# Patient Record
Sex: FEMALE | Race: BLACK OR AFRICAN AMERICAN | URBAN - METROPOLITAN AREA
[De-identification: names, ages, dates, MRNs, and addresses within clinical notes are randomized per-mention and may not be internally consistent; named-entity substitution may affect disease eponyms.]

---

## 2017-01-31 ENCOUNTER — ALLIED HEALTH/NURSE VISIT (OUTPATIENT)
Dept: CARDIOLOGY | Facility: CLINIC | Age: 37
End: 2017-01-31
Attending: INTERNAL MEDICINE
Payer: COMMERCIAL

## 2017-01-31 DIAGNOSIS — I42.9 CARDIOMYOPATHY (H): Primary | ICD-10-CM

## 2017-01-31 DIAGNOSIS — I50.22 CHRONIC SYSTOLIC HEART FAILURE (H): ICD-10-CM

## 2017-01-31 PROCEDURE — 93295 DEV INTERROG REMOTE 1/2/MLT: CPT | Performed by: INTERNAL MEDICINE

## 2017-01-31 PROCEDURE — 93296 REM INTERROG EVL PM/IDS: CPT | Mod: ZF

## 2017-01-31 NOTE — PROGRESS NOTES
Pt sent in routine remote ICD check for evaluation per MD order.  Her ICD check does not show any episodes of ventricular arrhythmias, she is RV pacing <0.1% of the time, her optivol trend is at baseline and her heart rate histograms show good heart rate variation.  Her ICD battery estimates 10.5 years left and her ICD is functioning normally.  Pt was called with the results and she reports feeling well and she denies complaints.   We will plan for another remote ICD check on 5/2/17.    Remote ICD

## 2017-06-12 ENCOUNTER — ALLIED HEALTH/NURSE VISIT (OUTPATIENT)
Dept: CARDIOLOGY | Facility: CLINIC | Age: 37
End: 2017-06-12
Attending: INTERNAL MEDICINE
Payer: COMMERCIAL

## 2017-06-12 DIAGNOSIS — I42.9 CARDIOMYOPATHY (H): Primary | ICD-10-CM

## 2017-06-12 PROCEDURE — 93296 REM INTERROG EVL PM/IDS: CPT | Mod: ZF

## 2017-06-12 PROCEDURE — 93295 DEV INTERROG REMOTE 1/2/MLT: CPT | Performed by: INTERNAL MEDICINE

## 2017-06-12 NOTE — MR AVS SNAPSHOT
After Visit Summary   6/12/2017    Carolyn Aggarwal    MRN: [id number]           Patient Information     Date Of Birth          1980        Visit Information        Provider Department      6/12/2017 6:00 AM  ICD [facility]        Today's Diagnoses     Cardiomyopathy (H)    -  1       Follow-ups after your visit        Who to contact     If you have questions or need follow up information about today's clinic visit or your schedule please contact [facility] directly at [phone].  Normal or non-critical lab and imaging results will be communicated to you by [employer], letter or phone within 4 business days after the clinic has received the results. If you do not hear from us within 7 days, please contact the clinic through [employer] or phone. If you have a critical or abnormal lab result, we will notify you by phone as soon as possible.  Submit refill requests through [employer] or call your pharmacy and they will forward the refill request to us. Please allow 3 business days for your refill to be completed.          Additional Information About Your Visit        MyChart Information     [employer] gives you secure access to your electronic health record. If you see a primary care provider, you can also send messages to your care team and make appointments. If you have questions, please call your primary care clinic.  If you do not have a primary care provider, please call [phone] and they will assist you.        Care EveryWhere ID     This is your Care EveryWhere ID. This could be used by other organizations to access your [place] medical records  [id number]         Blood Pressure from Last 3 Encounters:   09/23/16 120/72   09/12/16 131/70   09/10/16 107/63    Weight from Last 3 Encounters:   09/23/16 126.5 kg (278 lb 14.4 oz)   09/21/16 126.9 kg (279 lb 12.8 oz)   09/12/16 128.4 kg (283 lb)              We Performed the Following     INTERROGATION DEVICE CLEMENTE HARRELL,  PACER/ICD        Primary Care Provider Office Phone # Fax #    Jin Zarco [phone]       [facility] [place] [address] [address]        Thank you!     Thank you for choosing [facility]  for your care. Our goal is always to provide you with excellent care. Hearing back from our patients is one way we can continue to improve our services. Please take a few minutes to complete the written survey that you may receive in the mail after your visit with us. Thank you!             Your Updated Medication List - Protect others around you: Learn how to safely use, store and throw away your medicines at www.disposemymeds.org.          This list is accurate as of: 6/12/17 11:59 PM.  Always use your most recent med list.                   Brand Name Dispense Instructions for use    buPROPion 300 MG 24 hr tablet    WELLBUTRIN XL     Take 300 mg by mouth       escitalopram 10 MG tablet    LEXAPRO     Take 10 mg by mouth daily       * gabapentin 300 MG capsule    NEURONTIN     Take 300 mg by mouth 2 times daily       * gabapentin 300 MG capsule    NEURONTIN     Take 600 mg by mouth 2 times daily       IRON (FERROUS GLUCONATE) PO      Take 325 mg by mouth       isosorbide dinitrate 10 MG tablet    ISORDIL    90 tablet    Take 1 tablet (10 mg) by mouth 3 times daily       isosorbide mononitrate 30 MG 24 hr tablet    IMDUR     Take 30 mg by mouth       lamoTRIgine 25 MG tablet    LaMICtal     Take 25 mg by mouth       * losartan 25 MG tablet    COZAAR    90 tablet    Take 1 tablet (25 mg) by mouth daily       * losartan 25 MG tablet    COZAAR     Take 12.5 mg by mouth       magnesium oxide 400 (240 MG) MG tablet    MAG-OX     Take 400 mg by mouth       magnesium oxide 400 MG Caps      Take 400 mg by mouth daily       metoprolol 100 MG 24 hr tablet    TOPROL-XL     Take 1 tablet (100 mg) by mouth daily       naltrexone 50 MG tablet    DEPADE;REVIA    30 tablet    Take 1/2  Tablet then increase to maximum of 1 Full Tablet daily as tolerated.  Time it one to two hours prior to worst cravings       omeprazole 20 MG CR capsule    priLOSEC     Take 1 capsule (20 mg) by mouth daily       pantoprazole 40 MG EC tablet    PROTONIX     Take 40 mg by mouth       Potassium Chloride 40 MEQ/15ML (20%) Soln     1350 mL    Take 15 mLs (40 mEq) by mouth 3 times daily       potassium chloride SA 20 MEQ CR tablet    K-DUR/KLOR-CON M     Take 40 mEq by mouth daily       RA CETIRIZINE 10 MG tablet   Generic drug:  cetirizine      Take 10 mg by mouth       rivaroxaban ANTICOAGULANT 20 MG Tabs tablet    XARELTO    90 tablet    Take 1 tablet (20 mg) by mouth daily (with dinner)       spironolactone 25 MG tablet    ALDACTONE    180 tablet    Take 2 tablets (50 mg) by mouth daily       * topiramate 25 MG tablet    TOPAMAX    90 tablet    25 mg at bedtime for 1 week, 50 mg at bedtime for 1 week and 75 mg daily at bedtime thereafter       * topiramate 50 MG tablet    TOPAMAX    120 tablet    Take 1 tablet (50 mg) by mouth 2 times daily       torsemide 20 MG tablet    DEMADEX    90 tablet    Take 3 tablets (60 mg) by mouth daily       traZODone 100 MG tablet    DESYREL     Take 100 mg by mouth At Bedtime       * Notice:  This list has 6 medication(s) that are the same as other medications prescribed for you. Read the directions carefully, and ask your doctor or other care provider to review them with you.

## 2017-06-14 NOTE — PROGRESS NOTES
Remote ICD transmission received and reviewed.  Device transmission sent per MD orders.  Patient has a Medtronic single lead ICD.  Normal ICD function.  1 NSVT episode recorded - 1 sec, 231 bpm.  Presenting EGM = ST @ 115 bpm.   = <0.1%.  Thoracic impedance is slightly below the reference line.  Estimated battery longevity to JOSHUA = 10.2 years.  Patient notified of interrogation results.  Patient reports that she is feeling well.  Patient does report that she was a little more SOB this morning so she took an extra diuretic as prescribed.  Patient encouraged to call her physician if symptoms are not resolved.  Plan for patient to send a remote transmission in 3 months as scheduled.    Remote ICD transmission

## 2017-09-27 ENCOUNTER — ALLIED HEALTH/NURSE VISIT (OUTPATIENT)
Dept: CARDIOLOGY | Facility: CLINIC | Age: 37
End: 2017-09-27
Attending: INTERNAL MEDICINE
Payer: COMMERCIAL

## 2017-09-27 DIAGNOSIS — I42.9 CARDIOMYOPATHY (H): Primary | ICD-10-CM

## 2017-09-27 PROCEDURE — 93296 REM INTERROG EVL PM/IDS: CPT | Mod: ZF

## 2017-09-27 PROCEDURE — 93295 DEV INTERROG REMOTE 1/2/MLT: CPT | Performed by: INTERNAL MEDICINE

## 2017-09-27 NOTE — PROGRESS NOTES
"Scheduled Medtronic Carelink remote ICD transmission received and reviewed. Device transmission sent per MD orders. Her presenting rhythm is a regular ventricular rhythm @ 110 bpm. No arrhythmias recorded. Normal device function.  <0.1%. No short V-V intervals recorded. Lead trends appear stable. OptiVol thoracic impedance suggests increasing fluid retention. Battery estimates 10 years to JOSHUA. Pt notified of transmission results. Pt states she is SOB, sleeps sitting up and has \"swelling everywhere\". She states she has been to the [facility] ER twice \"and they keep sending me home\". She said she will go to the [facility] ER tonight if the SOB increases otherwise will call [facility] cardiologist or [facility] cardiologist in the morning to schedule an appointment. She has not been seen here since 8/2016. Pt's sister on the phone and verbalized understanding to having pt assessed in the very near future.  Remote ICD transmission      "

## 2017-09-27 NOTE — MR AVS SNAPSHOT
After Visit Summary   9/27/2017    Carolyn Aggarwal    MRN: [id number]           Patient Information     Date Of Birth          1980        Visit Information        Provider Department      9/27/2017 6:00 AM  ICD [facility]        Today's Diagnoses     Cardiomyopathy (H)    -  1       Follow-ups after your visit        Who to contact     If you have questions or need follow up information about today's clinic visit or your schedule please contact [facility] directly at [phone].  Normal or non-critical lab and imaging results will be communicated to you by [employer], letter or phone within 4 business days after the clinic has received the results. If you do not hear from us within 7 days, please contact the clinic through [employer] or phone. If you have a critical or abnormal lab result, we will notify you by phone as soon as possible.  Submit refill requests through [employer] or call your pharmacy and they will forward the refill request to us. Please allow 3 business days for your refill to be completed.          Additional Information About Your Visit        MyChart Information     [employer] gives you secure access to your electronic health record. If you see a primary care provider, you can also send messages to your care team and make appointments. If you have questions, please call your primary care clinic.  If you do not have a primary care provider, please call [phone] and they will assist you.        Care EveryWhere ID     This is your Care EveryWhere ID. This could be used by other organizations to access your [place] medical records  [id number]         Blood Pressure from Last 3 Encounters:   09/23/16 120/72   09/12/16 131/70   09/10/16 107/63    Weight from Last 3 Encounters:   09/23/16 126.5 kg (278 lb 14.4 oz)   09/21/16 126.9 kg (279 lb 12.8 oz)   09/12/16 128.4 kg (283 lb)              We Performed the Following     INTERROGATION DEVICE CLEMENTE HARRELL,  PACER/ICD        Primary Care Provider Office Phone # Fax #    Jin Zarco [phone]       [facility] [address] [facility] [address]        Equal Access to Services     NAA ROD : Hadii aad ku hadalmao Soomaali, waaxda luqadaha, qaybta kaalmada adeegyada, kanwal arguellon linda charles latoddeladio . So [facility] [phone].    ATENCIÓN: Si habla español, tiene a watson disposición servicios gratuitos de asistencia lingüística. [facility] [phone].    We comply with applicable federal civil rights laws and [place] laws. We do not discriminate on the basis of race, color, national origin, age, disability sex, sexual orientation or gender identity.            Thank you!     Thank you for choosing [facility]  for your care. Our goal is always to provide you with excellent care. Hearing back from our patients is one way we can continue to improve our services. Please take a few minutes to complete the written survey that you may receive in the mail after your visit with us. Thank you!             Your Updated Medication List - Protect others around you: Learn how to safely use, store and throw away your medicines at www.disposemymeds.org.          This list is accurate as of: 9/27/17  5:30 PM.  Always use your most recent med list.                   Brand Name Dispense Instructions for use Diagnosis    buPROPion 300 MG 24 hr tablet    WELLBUTRIN XL     Take 300 mg by mouth        escitalopram 10 MG tablet    LEXAPRO     Take 10 mg by mouth daily        * gabapentin 300 MG capsule    NEURONTIN     Take 300 mg by mouth 2 times daily        * gabapentin 300 MG capsule    NEURONTIN     Take 600 mg by mouth 2 times daily        IRON (FERROUS GLUCONATE) PO      Take 325 mg by mouth        isosorbide dinitrate 10 MG tablet    ISORDIL    90 tablet    Take 1 tablet (10 mg) by mouth 3 times daily    Chronic systolic heart failure (H)       isosorbide mononitrate 30 MG 24 hr  tablet    IMDUR     Take 30 mg by mouth        lamoTRIgine 25 MG tablet    LaMICtal     Take 25 mg by mouth        * losartan 25 MG tablet    COZAAR    90 tablet    Take 1 tablet (25 mg) by mouth daily    Chronic systolic congestive heart failure (H)       * losartan 25 MG tablet    COZAAR     Take 12.5 mg by mouth        magnesium oxide 400 (240 MG) MG tablet    MAG-OX     Take 400 mg by mouth        magnesium oxide 400 MG Caps      Take 400 mg by mouth daily    Hypomagnesemia       metoprolol 100 MG 24 hr tablet    TOPROL-XL     Take 1 tablet (100 mg) by mouth daily    Chronic systolic heart failure (H), NSTEMI (non-ST elevated myocardial infarction) (H)       naltrexone 50 MG tablet    DEPADE;REVIA    30 tablet    Take 1/2 Tablet then increase to maximum of 1 Full Tablet daily as tolerated.  Time it one to two hours prior to worst cravings    Morbid obesity, unspecified obesity type (H)       omeprazole 20 MG CR capsule    priLOSEC     Take 1 capsule (20 mg) by mouth daily    Gastroesophageal reflux disease without esophagitis       pantoprazole 40 MG EC tablet    PROTONIX     Take 40 mg by mouth        Potassium Chloride 40 MEQ/15ML (20%) Soln     1350 mL    Take 15 mLs (40 mEq) by mouth 3 times daily    Hypokalemia       potassium chloride SA 20 MEQ CR tablet    K-DUR/KLOR-CON M     Take 40 mEq by mouth daily        RA CETIRIZINE 10 MG tablet   Generic drug:  cetirizine      Take 10 mg by mouth        rivaroxaban ANTICOAGULANT 20 MG Tabs tablet    XARELTO    90 tablet    Take 1 tablet (20 mg) by mouth daily (with dinner)    History of pulmonary embolism       spironolactone 25 MG tablet    ALDACTONE    180 tablet    Take 2 tablets (50 mg) by mouth daily    Gastroesophageal reflux disease without esophagitis       * topiramate 25 MG tablet    TOPAMAX    90 tablet    25 mg at bedtime for 1 week, 50 mg at bedtime for 1 week and 75 mg daily at bedtime thereafter    Morbid obesity due to excess calories (H)       *  topiramate 50 MG tablet    TOPAMAX    120 tablet    Take 1 tablet (50 mg) by mouth 2 times daily    Morbid obesity, unspecified obesity type (H)       torsemide 20 MG tablet    DEMADEX    90 tablet    Take 3 tablets (60 mg) by mouth daily    Chronic systolic congestive heart failure (H)       traZODone 100 MG tablet    DESYREL     Take 100 mg by mouth At Bedtime    Depression with anxiety       * Notice:  This list has 6 medication(s) that are the same as other medications prescribed for you. Read the directions carefully, and ask your doctor or other care provider to review them with you.

## 2017-09-28 ENCOUNTER — TELEPHONE (OUTPATIENT)
Dept: CARDIOLOGY | Facility: CLINIC | Age: 37
End: 2017-09-28

## 2017-09-29 ENCOUNTER — HOSPITAL ENCOUNTER (OUTPATIENT)
Facility: CLINIC | Age: 37
Setting detail: SPECIMEN
Discharge: HOME | End: 2017-09-29
Admitting: NURSE PRACTITIONER
Payer: COMMERCIAL

## 2017-09-29 DIAGNOSIS — I50.22 CHRONIC SYSTOLIC HEART FAILURE (H): ICD-10-CM

## 2017-09-29 LAB
ANION GAP SERPL CALCULATED.3IONS-SCNC: 6 MMOL/L (ref 3–14)
BUN SERPL-MCNC: 12 MG/DL (ref 7–30)
CALCIUM SERPL-MCNC: 8.4 MG/DL (ref 8.5–10.1)
CHLORIDE SERPL-SCNC: 106 MMOL/L (ref 94–109)
CO2 SERPL-SCNC: 29 MMOL/L (ref 20–32)
CREAT SERPL-MCNC: 0.76 MG/DL (ref 0.52–1.04)
GFR SERPL CREATININE-BSD FRML MDRD: 85 ML/MIN/1.7M2
GLUCOSE SERPL-MCNC: 98 MG/DL (ref 70–99)
POTASSIUM SERPL-SCNC: 3.4 MMOL/L (ref 3.4–5.3)
SODIUM SERPL-SCNC: 141 MMOL/L (ref 133–144)

## 2017-09-29 PROCEDURE — 36415 COLL VENOUS BLD VENIPUNCTURE: CPT | Performed by: NURSE PRACTITIONER

## 2017-09-30 ENCOUNTER — HOSPITAL ENCOUNTER (EMERGENCY)
Facility: CLINIC | Age: 37
Discharge: HOME | End: 2017-09-30
Attending: EMERGENCY MEDICINE | Admitting: EMERGENCY MEDICINE
Payer: COMMERCIAL

## 2017-09-30 ENCOUNTER — APPOINTMENT (OUTPATIENT)
Dept: GENERAL RADIOLOGY | Facility: CLINIC | Age: 37
End: 2017-09-30
Attending: EMERGENCY MEDICINE
Payer: COMMERCIAL

## 2017-09-30 ENCOUNTER — APPOINTMENT (OUTPATIENT)
Dept: CT IMAGING | Facility: CLINIC | Age: 37
End: 2017-09-30
Attending: EMERGENCY MEDICINE
Payer: COMMERCIAL

## 2017-09-30 ENCOUNTER — ALLIED HEALTH/NURSE VISIT (OUTPATIENT)
Dept: CARDIOLOGY | Facility: CLINIC | Age: 37
End: 2017-09-30
Attending: INTERNAL MEDICINE
Payer: COMMERCIAL

## 2017-09-30 VITALS
WEIGHT: 287.7 LBS | TEMPERATURE: 98.5 F | DIASTOLIC BLOOD PRESSURE: 91 MMHG | BODY MASS INDEX: 52.6 KG/M2 | OXYGEN SATURATION: 99 % | SYSTOLIC BLOOD PRESSURE: 134 MMHG | RESPIRATION RATE: 25 BRPM

## 2017-09-30 DIAGNOSIS — I42.0 DILATED CARDIOMYOPATHY (H): ICD-10-CM

## 2017-09-30 DIAGNOSIS — E87.70 HYPERVOLEMIA, UNSPECIFIED HYPERVOLEMIA TYPE: ICD-10-CM

## 2017-09-30 DIAGNOSIS — I42.9 CARDIOMYOPATHY (H): Primary | ICD-10-CM

## 2017-09-30 DIAGNOSIS — E87.79 OTHER HYPERVOLEMIA: ICD-10-CM

## 2017-09-30 DIAGNOSIS — R06.00 DYSPNEA, UNSPECIFIED TYPE: ICD-10-CM

## 2017-09-30 DIAGNOSIS — E87.6 HYPOKALEMIA: ICD-10-CM

## 2017-09-30 LAB
ALBUMIN SERPL-MCNC: 2.9 G/DL (ref 3.4–5)
ALBUMIN UR-MCNC: NEGATIVE MG/DL
ALP SERPL-CCNC: 83 U/L (ref 40–150)
ALT SERPL W P-5'-P-CCNC: 28 U/L (ref 0–50)
AMPHETAMINES UR QL SCN: NEGATIVE
ANION GAP SERPL CALCULATED.3IONS-SCNC: 9 MMOL/L (ref 3–14)
APPEARANCE UR: CLEAR
AST SERPL W P-5'-P-CCNC: 19 U/L (ref 0–45)
BARBITURATES UR QL: NEGATIVE
BASOPHILS # BLD AUTO: 0 10E9/L (ref 0–0.2)
BASOPHILS NFR BLD AUTO: 0.3 %
BENZODIAZ UR QL: NEGATIVE
BILIRUB DIRECT SERPL-MCNC: 0.1 MG/DL (ref 0–0.2)
BILIRUB SERPL-MCNC: 0.5 MG/DL (ref 0.2–1.3)
BILIRUB UR QL STRIP: NEGATIVE
BUN SERPL-MCNC: 8 MG/DL (ref 7–30)
CALCIUM SERPL-MCNC: 8.3 MG/DL (ref 8.5–10.1)
CANNABINOIDS UR QL SCN: NEGATIVE
CHLORIDE SERPL-SCNC: 108 MMOL/L (ref 94–109)
CO2 SERPL-SCNC: 26 MMOL/L (ref 20–32)
COCAINE UR QL: NEGATIVE
COLOR UR AUTO: ABNORMAL
CREAT SERPL-MCNC: 0.65 MG/DL (ref 0.52–1.04)
DIFFERENTIAL METHOD BLD: ABNORMAL
EOSINOPHIL # BLD AUTO: 0.3 10E9/L (ref 0–0.7)
EOSINOPHIL NFR BLD AUTO: 3.2 %
ERYTHROCYTE [DISTWIDTH] IN BLOOD BY AUTOMATED COUNT: 16.6 % (ref 10–15)
ETHANOL UR QL SCN: NEGATIVE
GFR SERPL CREATININE-BSD FRML MDRD: >90 ML/MIN/1.7M2
GLUCOSE SERPL-MCNC: 107 MG/DL (ref 70–99)
GLUCOSE UR STRIP-MCNC: NEGATIVE MG/DL
HCT VFR BLD AUTO: 33.5 % (ref 35–47)
HGB BLD-MCNC: 10.1 G/DL (ref 11.7–15.7)
HGB UR QL STRIP: NEGATIVE
IMM GRANULOCYTES # BLD: 0 10E9/L (ref 0–0.4)
IMM GRANULOCYTES NFR BLD: 0.3 %
KETONES UR STRIP-MCNC: NEGATIVE MG/DL
LEUKOCYTE ESTERASE UR QL STRIP: NEGATIVE
LYMPHOCYTES # BLD AUTO: 2.2 10E9/L (ref 0.8–5.3)
LYMPHOCYTES NFR BLD AUTO: 24 %
MCH RBC QN AUTO: 25.2 PG (ref 26.5–33)
MCHC RBC AUTO-ENTMCNC: 30.1 G/DL (ref 31.5–36.5)
MCV RBC AUTO: 84 FL (ref 78–100)
MONOCYTES # BLD AUTO: 0.9 10E9/L (ref 0–1.3)
MONOCYTES NFR BLD AUTO: 9.5 %
MUCOUS THREADS #/AREA URNS LPF: PRESENT /LPF
NEUTROPHILS # BLD AUTO: 5.7 10E9/L (ref 1.6–8.3)
NEUTROPHILS NFR BLD AUTO: 62.7 %
NITRATE UR QL: NEGATIVE
NRBC # BLD AUTO: 0 10*3/UL
NRBC BLD AUTO-RTO: 0 /100
NT-PROBNP SERPL-MCNC: 468 PG/ML (ref 0–450)
OPIATES UR QL SCN: NEGATIVE
PH UR STRIP: 7.5 PH (ref 5–7)
PLATELET # BLD AUTO: 304 10E9/L (ref 150–450)
PLATELET # BLD EST: ABNORMAL 10*3/UL
POTASSIUM SERPL-SCNC: 3.3 MMOL/L (ref 3.4–5.3)
PROCALCITONIN SERPL-MCNC: <0.05 NG/ML
PROT SERPL-MCNC: 6.7 G/DL (ref 6.8–8.8)
RBC # BLD AUTO: 4.01 10E12/L (ref 3.8–5.2)
RBC #/AREA URNS AUTO: 0 /HPF (ref 0–2)
SODIUM SERPL-SCNC: 143 MMOL/L (ref 133–144)
SOURCE: ABNORMAL
SP GR UR STRIP: 1.01 (ref 1–1.03)
SQUAMOUS #/AREA URNS AUTO: 1 /HPF (ref 0–1)
TROPONIN I SERPL-MCNC: <0.015 UG/L (ref 0–0.04)
UROBILINOGEN UR STRIP-MCNC: NORMAL MG/DL (ref 0–2)
WBC # BLD AUTO: 9 10E9/L (ref 4–11)
WBC #/AREA URNS AUTO: 0 /HPF (ref 0–2)

## 2017-09-30 PROCEDURE — 25000132 ZZH RX MED GY IP 250 OP 250 PS 637: Performed by: EMERGENCY MEDICINE

## 2017-09-30 PROCEDURE — 80048 BASIC METABOLIC PNL TOTAL CA: CPT | Performed by: EMERGENCY MEDICINE

## 2017-09-30 PROCEDURE — 25000125 ZZHC RX 250: Performed by: EMERGENCY MEDICINE

## 2017-09-30 PROCEDURE — 93010 ELECTROCARDIOGRAM REPORT: CPT | Mod: Z6 | Performed by: EMERGENCY MEDICINE

## 2017-09-30 PROCEDURE — 93005 ELECTROCARDIOGRAM TRACING: CPT | Performed by: EMERGENCY MEDICINE

## 2017-09-30 PROCEDURE — 71020 XR CHEST 2 VW: CPT

## 2017-09-30 PROCEDURE — 80076 HEPATIC FUNCTION PANEL: CPT | Performed by: STUDENT IN AN ORGANIZED HEALTH CARE EDUCATION/TRAINING PROGRAM

## 2017-09-30 PROCEDURE — 85025 COMPLETE CBC W/AUTO DIFF WBC: CPT | Performed by: EMERGENCY MEDICINE

## 2017-09-30 PROCEDURE — S0171 BUMETANIDE 0.5 MG: HCPCS | Performed by: EMERGENCY MEDICINE

## 2017-09-30 PROCEDURE — 99285 EMERGENCY DEPT VISIT HI MDM: CPT | Mod: 25 | Performed by: EMERGENCY MEDICINE

## 2017-09-30 PROCEDURE — 96374 THER/PROPH/DIAG INJ IV PUSH: CPT | Performed by: EMERGENCY MEDICINE

## 2017-09-30 PROCEDURE — 71260 CT THORAX DX C+: CPT

## 2017-09-30 PROCEDURE — 25000128 H RX IP 250 OP 636: Performed by: EMERGENCY MEDICINE

## 2017-09-30 PROCEDURE — 80320 DRUG SCREEN QUANTALCOHOLS: CPT | Performed by: EMERGENCY MEDICINE

## 2017-09-30 PROCEDURE — 36415 COLL VENOUS BLD VENIPUNCTURE: CPT | Performed by: EMERGENCY MEDICINE

## 2017-09-30 PROCEDURE — 81001 URINALYSIS AUTO W/SCOPE: CPT | Performed by: EMERGENCY MEDICINE

## 2017-09-30 PROCEDURE — 80307 DRUG TEST PRSMV CHEM ANLYZR: CPT | Performed by: EMERGENCY MEDICINE

## 2017-09-30 PROCEDURE — 84145 PROCALCITONIN (PCT): CPT | Performed by: EMERGENCY MEDICINE

## 2017-09-30 PROCEDURE — 84484 ASSAY OF TROPONIN QUANT: CPT | Performed by: EMERGENCY MEDICINE

## 2017-09-30 PROCEDURE — 80076 HEPATIC FUNCTION PANEL: CPT | Performed by: EMERGENCY MEDICINE

## 2017-09-30 PROCEDURE — 83880 ASSAY OF NATRIURETIC PEPTIDE: CPT | Performed by: EMERGENCY MEDICINE

## 2017-09-30 RX ORDER — POTASSIUM CHLORIDE 20MEQ/15ML
40 LIQUID (ML) ORAL ONCE
Status: COMPLETED | OUTPATIENT
Start: 2017-09-30 | End: 2017-09-30

## 2017-09-30 RX ORDER — ACETAMINOPHEN 500 MG
1000 TABLET ORAL ONCE
Status: DISCONTINUED | OUTPATIENT
Start: 2017-09-30 | End: 2017-09-30 | Stop reason: HOSPADM

## 2017-09-30 RX ORDER — BUMETANIDE 0.25 MG/ML
2 INJECTION INTRAMUSCULAR; INTRAVENOUS ONCE
Status: COMPLETED | OUTPATIENT
Start: 2017-09-30 | End: 2017-09-30

## 2017-09-30 RX ORDER — IOPAMIDOL 755 MG/ML
77 INJECTION, SOLUTION INTRAVASCULAR ONCE
Status: COMPLETED | OUTPATIENT
Start: 2017-09-30 | End: 2017-09-30

## 2017-09-30 RX ADMIN — IOPAMIDOL 77 ML: 755 INJECTION, SOLUTION INTRAVENOUS at 13:37

## 2017-09-30 RX ADMIN — POTASSIUM CHLORIDE 40 MEQ: 20 SOLUTION ORAL at 14:06

## 2017-09-30 RX ADMIN — BUMETANIDE 2 MG: 0.25 INJECTION INTRAMUSCULAR; INTRAVENOUS at 14:03

## 2017-09-30 ASSESSMENT — ENCOUNTER SYMPTOMS
FATIGUE: 1
UNEXPECTED WEIGHT CHANGE: 1
COUGH: 1
CHEST TIGHTNESS: 1
FEVER: 0
SHORTNESS OF BREATH: 1
ABDOMINAL PAIN: 0

## 2017-09-30 NOTE — H&P
"         Cardiology History and Physical  Carolyn Aggarwal MRN: [id number]  Age: 37 year old, : 1980  Primary care provider: Jin Zarco           Chief Complaint:     \"I have extra fluid\"          History of Present Illness:     History is obtained from the patient     Carolyn Aggarwal is a 37 year old female with history of NICM EF 25% s/p ICD thought secondary to polysubstance abuse, moderate MR, NSTEMI (2015), ÁNGEL, prior bilateral PE on Xarelto, HTN, and obesity who presents to [facility] with increasing weight, LE edema, and SOB.    Mrs. Aggarwal reports her symptoms began getting worse on Friday,  (8 days prior to admission). Prior to this was at her usual baseline (no orthopnea/PND/trace LE edema/able to walk greater than 1 block). Was on a car trip to [place] and claims prior exacerbations have been triggered by car rides. States she follows low sodium and low fluid diet and is compliant with her medications.    Currently, she reports 4 pillow orthopnea, PND, bilateral LE edema (L>R at baseline), sharp chest pain, decreased exercise tolerance (able to walk 0.5 blocks), 13 pound weight gain, lightheadedness, and palpitations. Additionally was told by device nurse that there is increased optival impedence which signals increased fluid. Has increased her home diuretic and taken metolazone without improvement. Has been seen in the [facility] ED (primary site for care) and [facility] in the past week both times being discharged to home. In addition to above has been having a dry cough, chills, abdominal pain/distention, and recently loose stools. Denies fevers, dysuria, vomiting, melena, changes in skin.    CHF present for 3 years and has resulted in multiple hospitalizations for excess fluid. Mrs. Aggarwal states the etiology is unclear, per outpatient cardiology notes from [facility] polysubstance abuse is listed as likely cause. Currently denying any drug use.    In ED, EKG and troponin negative for " ischemia. Given history of PE, CT of chest obtained, negative for clot, evidence of pulmonary edema and aspiration vs atelectasis.    ROS is negative other than listed above.          Past Medical History:     Past Medical History:   Diagnosis Date     Congestive heart failure, unspecified      Coronary artery disease, non-occlusive     NSTEMI 12/2015 with troponin 12, vasospasm vs. embolic     Depressive disorder      Encounter for insertion of mirena IUD 2013    expelled within 1 week of insertion     Encounter for surveillance of injectable contraceptive [id number]    D/C'd 2nd weight gain     Hemoptysis 1/18/2016     Hypertension      Hypokalemia 1/18/2016     Hypomagnesemia 1/18/2016     Myocardial infarction (H)     february 2016     Normal puberty menarche age 9    cycles q mo x8+ d with severe cramps     Oral contraceptive use     NOTE: estrogen contracpetion contraindicated      Pulmonary embolism (H)               Past Surgical History:      Past Surgical History:   Procedure Laterality Date     CARDIAC SURGERY      stents x2 april 2016     CHOLECYSTECTOMY  age 23?     DEFIBRILLATOR  age 34    placed     EXTRACTION(S) DENTAL  age 18     IMPLANT PACEMAKER       KNEE SURGERY Left     arthroscoptic     ORTHOPEDIC SURGERY                Social History:     Lives with significant other, 2 adult children who do not live in the area. Former tobacco (quit 11/16), occasional EtOH use, denies illicit drug history. Spends most of her time around the house, at baseline is able to clean/cook and care for the house and herself.           Family History:     Grandmother with heart disease and ICD.          Allergies:     Allergies   Allergen Reactions     Tape [Adhesive Tape]      Liquid Adhesive Rash              Medications:     Current Facility-Administered Medications   Medication     acetaminophen (TYLENOL) tablet 1,000 mg     Current Outpatient Prescriptions   Medication Sig     naltrexone (DEPADE;REVIA) 50 MG  tablet Take 1/2 Tablet then increase to maximum of 1 Full Tablet daily as tolerated.  Time it one to two hours prior to worst cravings     topiramate (TOPAMAX) 50 MG tablet Take 1 tablet (50 mg) by mouth 2 times daily     buPROPion (WELLBUTRIN XL) 300 MG 24 hr tablet Take 300 mg by mouth     cetirizine (RA CETIRIZINE) 10 MG tablet Take 10 mg by mouth     escitalopram (LEXAPRO) 10 MG tablet Take 10 mg by mouth daily     IRON, FERROUS GLUCONATE, PO Take 325 mg by mouth     isosorbide mononitrate (IMDUR) 30 MG 24 hr tablet Take 30 mg by mouth     lamoTRIgine (LAMICTAL) 25 MG tablet Take 25 mg by mouth     pantoprazole (PROTONIX) 40 MG enteric coated tablet Take 40 mg by mouth     potassium chloride SA (K-DUR,KLOR-CON M) 20 MEQ tablet Take 40 mEq by mouth daily     gabapentin (NEURONTIN) 300 MG capsule Take 600 mg by mouth 2 times daily     losartan (COZAAR) 25 MG tablet Take 12.5 mg by mouth     magnesium oxide (MAG-OX) 400 (240 MG) MG tablet Take 400 mg by mouth     gabapentin (NEURONTIN) 300 MG capsule Take 300 mg by mouth 2 times daily     Potassium Chloride 40 MEQ/15ML (20%) SOLN Take 15 mLs (40 mEq) by mouth 3 times daily     topiramate (TOPAMAX) 25 MG tablet 25 mg at bedtime for 1 week, 50 mg at bedtime for 1 week and 75 mg daily at bedtime thereafter     torsemide (DEMADEX) 20 MG tablet Take 3 tablets (60 mg) by mouth daily     spironolactone (ALDACTONE) 25 MG tablet Take 2 tablets (50 mg) by mouth daily     isosorbide dinitrate (ISORDIL) 10 MG tablet Take 1 tablet (10 mg) by mouth 3 times daily     losartan (COZAAR) 25 MG tablet Take 1 tablet (25 mg) by mouth daily     rivaroxaban ANTICOAGULANT (XARELTO) 20 MG TABS tablet Take 1 tablet (20 mg) by mouth daily (with dinner)     magnesium oxide 400 MG CAPS Take 400 mg by mouth daily     metoprolol (TOPROL-XL) 100 MG 24 hr tablet Take 1 tablet (100 mg) by mouth daily     omeprazole (PRILOSEC) 20 MG capsule Take 1 capsule (20 mg) by mouth daily     traZODone  (DESYREL) 100 MG tablet Take 100 mg by mouth At Bedtime                 Physical Exam:     B/P: 144/95, T: 98.5, P: 100, R: 23    Wt Readings from Last 4 Encounters:   09/30/17 130.5 kg (287 lb 11.2 oz)   09/23/16 126.5 kg (278 lb 14.4 oz)   09/21/16 126.9 kg (279 lb 12.8 oz)   09/12/16 128.4 kg (283 lb)     Intake/Output Summary (Last 24 hours) at 09/30/17 1518  Last data filed at 09/30/17 1515   Gross per 24 hour   Intake              120 ml   Output                0 ml   Net              120 ml     Gen: AA&Ox3, in mild distress occasionally repositioning in bed  HEENT: AT/NC, PERRL b/l, EOM grossly intact, mucous membranes pink, moist without plaque or exudate  BACK: no CVA tenderness, no midline bony tenderness  PULM/THORAX: Clear to auscultation bilaterally, no rales/rhonchi/wheezes  CV: tachycardic, regular, S1 and S2 appreciated, no extra heart sounds, II/VI systolic murmur heard best at the apex, no rub auscultated. Mild JVD to level of lower 1/3 neck while sitting upright  ABD: obese, soft, nontender, nondistended. Normoactive bowel sounds, no HSM appreciated.  EXT: 1+ edema, no clubbing or cyanosis. No asymmetrical edema. Warm and well perfused.  NEURO: CN II-XII intact, strength 5/5 throughout    Lines: PIV    Drips: none          Data:     Labs Reviewed:     Ref. Range 9/30/2017 12:32   Sodium Latest Ref Range: 133 - 144 mmol/L 143   Potassium Latest Ref Range: 3.4 - 5.3 mmol/L 3.3 (L)   Chloride Latest Ref Range: 94 - 109 mmol/L 108   Carbon Dioxide Latest Ref Range: 20 - 32 mmol/L 26   Urea Nitrogen Latest Ref Range: 7 - 30 mg/dL 8   Creatinine Latest Ref Range: 0.52 - 1.04 mg/dL 0.65   GFR Estimate Latest Ref Range: >60 mL/min/1.7m2 >90   GFR Estimate If Black Latest Ref Range: >60 mL/min/1.7m2 >90   Calcium Latest Ref Range: 8.5 - 10.1 mg/dL 8.3 (L)   Anion Gap Latest Ref Range: 3 - 14 mmol/L 9   N-Terminal Pro BNP Inpatient Latest Ref Range: 0 - 450 pg/mL 468 (H)   Troponin I ES Latest Ref Range:  0.000 - 0.045 ug/L <0.015     Hgb: 10.1 (MCV 84)  WBC: 9.0  Plt: 304      Most Recent Imaging:     CXR 9/30: IMPRESSION:  Cardiomegaly.  Mild pulmonary vascular congestion.    CT C 9/30:  IMPRESSION:   1. No main, lobar or segmental pulmonary embolus. Respiratory motion  artifact limits evaluation of the segmental pulmonary arteries which  appears somewhat heterogeneous and attenuated though no convincing  filling defect is identified.  2. Diffuse groundglass opacity with areas of smooth interlobular  septal thickening suggestive of pulmonary edema. No pleural effusion.  3. Air trapping the superior segment of right lower lobe may be  secondary to small airway disease.  4. Small area of consolidation in the inferior posterior left lower  lobe has appearance of rounded atelectasis; however, no definite  overlying pleural abnormality is identified. Alternative  considerations include ring and/or consolidation.    EKG 9/30:  Sinus tachycardia and prolonged QTc (528)    Stress Test 7/18/17:  CONCLUSIONS  Abnormal study with Breast and Soft tissue Attenuation   Decreased EF with dilated LV - EF 35% - golbal   Inferior Scar - moderate with No significant ischemia     Echo 7/13/17:     Summary:   1. LV function is severely reduced. The visually estimated ejection fraction is 25-30%.   2. Abnormal septal motion consistent with RV pacemaker.   3. Mild concentric left ventricular hypertrophy.   4. Moderate diastolic dysfunction.   5. Severe global hypokinesis.   6. Moderate mitral valve regurgitation.   7. Compared to prior study (4/25/2016); there's no significant change.    Cath 12/2015:  No significant coronary artery disease  Severe cardiomyopathy          Assessment and Plan:     Carolyn Aggarwal is a 37 year old female with history of NICM EF 25% s/p ICD thought secondary to polysubstance abuse, moderate MR, NSTEMI (12/2015), ÁNGEL, prior bilateral PE on Xarelto, HTN, and obesity who presents to [facility] with increasing weight, LE  edema, and SOB found to have volume overload with warm extremities and normal BMP.    #Hypervolemia  #NICM EF 25% 2/2 polysubstance abuse s/p ICD: evidence of excess fluid based on imaging, increased impedence, orthopnea, PND, LE edema, elevated JVP. Fortunately, does not appear to be in shock given normal creatinine and warm/well perfused extremities. Unclear of inciting incident, could certainly be related to dietary indiscretion. EKG and troponin negative for ischemia, CT negative for PE, no evidence of arrhythmia. Possibly infectious given chills and questionable infiltrate, procalcitonin pending. Failed outpatient up titration of diuretics likely from gut edema. Having good response to IV bumex given in ED, will continue to monitor, hope to give additional dose today. Waiting on medication reconciliation to determine which home medications to continue. BP should be able to allow ACE and aldosterone inhibitors, plan to decrease beta blocker and switch to short acting.  -IV diuresis, monitor response to IV bumex and plan to repeat today  -strict I/O's and daily weights  -likely continue home ACEI and spironolactone  -anticipate decreasing home beta blocker and switching to short acting  -one additional troponin  -follow up procalcitonin - suspicion for infection low    #Hx of bilateral PE: concern given recent long car ride, fortunately CT chest negative for clot. Will continue Xarelto.    #Hypokalemia: suspect secondary to diuretics, replaced orally in ED, will recheck again in the evening and replace as needed.    FEN: cardiac/2L diet  PPX: home anticoagulation    Code Status: Full CODE     Patient to be discussed with staff attending, Dr. Darden.  Please feel free to page with questions.    Joel Ireland  PGY-2 Internal Medicine  Cardiology Service  Pager: [phone]    ADDENDUM:    Informed by ED that patient was feeling improved after dose of IV diuretic and wanted to be discharged. ED provider who has  been caring for patient felt this was reasonable. She will continue with her outpatient diuretic regimen, hopefully will have better response after dose of IV.

## 2017-09-30 NOTE — ED PROVIDER NOTES
"  History     Chief Complaint   Patient presents with     Shortness of Breath     HPI  Carolyn Aggarwal is a 37 year old female with a history of PE on Xarelto, CHF, HTN, lupus anticoagulant positive, and cardiomyopathy. The patient presents to the ED with complaints of chest tightness and SOB. She reports that her fluid has been up since last Friday, 9/22, where when she weight herself, she was at 287. She is normally at 275. The patient was seen at [facility] for her symptoms after the insistence of her cardiology nurse. She was sent home with \"nothing done\" per her report. She then was seen yesterday at the  for her complaints, and at that time they [place] labs and sent her home. She now presents because she is still not feeling better, and she is having difficulty sleeping. She reports a cough, and she has been compliant with her Xarelto as well.      PAST MEDICAL HISTORY:   Past Medical History:   Diagnosis Date     Congestive heart failure, unspecified      Coronary artery disease, non-occlusive     NSTEMI 12/2015 with troponin 12, vasospasm vs. embolic     Depressive disorder      Encounter for insertion of mirena IUD 2013    expelled within 1 week of insertion     Encounter for surveillance of injectable contraceptive [id number]    D/C'd 2nd weight gain     Hemoptysis 1/18/2016     Hypertension      Hypokalemia 1/18/2016     Hypomagnesemia 1/18/2016     Myocardial infarction (H)     february 2016     Normal puberty menarche age 9    cycles q mo x8+ d with severe cramps     Oral contraceptive use     NOTE: estrogen contracpetion contraindicated      Pulmonary embolism (H)        PAST SURGICAL HISTORY:   Past Surgical History:   Procedure Laterality Date     CARDIAC SURGERY      stents x2 april 2016     CHOLECYSTECTOMY  age 23?     DEFIBRILLATOR  age 34    placed     EXTRACTION(S) DENTAL  age 18     IMPLANT PACEMAKER       KNEE SURGERY Left     arthroscoptic     ORTHOPEDIC SURGERY         FAMILY HISTORY: "   Family History   Problem Relation Age of Onset     DIABETES No family hx of      CEREBROVASCULAR DISEASE No family hx of      Breast Cancer No family hx of      Substance Abuse No family hx of      MENTAL ILLNESS No family hx of      Genetic Disorder No family hx of      Hypertension Mother 45     Hypertension Maternal Grandmother      Thyroid Disease No family hx of      Prostate Cancer No family hx of      Other Cancer No family hx of      Coronary Artery Disease Maternal Grandmother 35     Coronary Artery Disease No family hx of      Colon Cancer No family hx of        SOCIAL HISTORY:   Social History   Substance Use Topics     Smoking status: Former Smoker     Packs/day: 1.00     Years: 1.00     Start date: 5/6/1990     Quit date: 7/18/2015     Smokeless tobacco: Never Used     Alcohol use No      Comment: 1d ~ 1x/mo MAX       Patient's Medications   New Prescriptions    No medications on file   Previous Medications    BUPROPION (WELLBUTRIN XL) 300 MG 24 HR TABLET    Take 300 mg by mouth    CETIRIZINE (RA CETIRIZINE) 10 MG TABLET    Take 10 mg by mouth    ESCITALOPRAM (LEXAPRO) 10 MG TABLET    Take 10 mg by mouth daily    GABAPENTIN (NEURONTIN) 300 MG CAPSULE    Take 300 mg by mouth 2 times daily    GABAPENTIN (NEURONTIN) 300 MG CAPSULE    Take 600 mg by mouth 2 times daily    IRON, FERROUS GLUCONATE, PO    Take 325 mg by mouth    ISOSORBIDE DINITRATE (ISORDIL) 10 MG TABLET    Take 1 tablet (10 mg) by mouth 3 times daily    ISOSORBIDE MONONITRATE (IMDUR) 30 MG 24 HR TABLET    Take 30 mg by mouth    LAMOTRIGINE (LAMICTAL) 25 MG TABLET    Take 25 mg by mouth    LOSARTAN (COZAAR) 25 MG TABLET    Take 1 tablet (25 mg) by mouth daily    LOSARTAN (COZAAR) 25 MG TABLET    Take 12.5 mg by mouth    MAGNESIUM OXIDE (MAG-OX) 400 (240 MG) MG TABLET    Take 400 mg by mouth    MAGNESIUM OXIDE 400 MG CAPS    Take 400 mg by mouth daily    METOPROLOL (TOPROL-XL) 100 MG 24 HR TABLET    Take 1 tablet (100 mg) by mouth daily     NALTREXONE (DEPADE;REVIA) 50 MG TABLET    Take 1/2 Tablet then increase to maximum of 1 Full Tablet daily as tolerated.  Time it one to two hours prior to worst cravings    OMEPRAZOLE (PRILOSEC) 20 MG CAPSULE    Take 1 capsule (20 mg) by mouth daily    PANTOPRAZOLE (PROTONIX) 40 MG ENTERIC COATED TABLET    Take 40 mg by mouth    POTASSIUM CHLORIDE 40 MEQ/15ML (20%) SOLN    Take 15 mLs (40 mEq) by mouth 3 times daily    POTASSIUM CHLORIDE SA (K-DUR,KLOR-CON M) 20 MEQ TABLET    Take 40 mEq by mouth daily    RIVAROXABAN ANTICOAGULANT (XARELTO) 20 MG TABS TABLET    Take 1 tablet (20 mg) by mouth daily (with dinner)    SPIRONOLACTONE (ALDACTONE) 25 MG TABLET    Take 2 tablets (50 mg) by mouth daily    TOPIRAMATE (TOPAMAX) 25 MG TABLET    25 mg at bedtime for 1 week, 50 mg at bedtime for 1 week and 75 mg daily at bedtime thereafter    TOPIRAMATE (TOPAMAX) 50 MG TABLET    Take 1 tablet (50 mg) by mouth 2 times daily    TORSEMIDE (DEMADEX) 20 MG TABLET    Take 3 tablets (60 mg) by mouth daily    TRAZODONE (DESYREL) 100 MG TABLET    Take 100 mg by mouth At Bedtime    Modified Medications    No medications on file   Discontinued Medications    No medications on file          Allergies   Allergen Reactions     Tape [Adhesive Tape]      Liquid Adhesive Rash             I have reviewed the Medications, Allergies, Past Medical and Surgical History, and Social History in the Epic system.    Review of Systems   Constitutional: Positive for fatigue and unexpected weight change. Negative for fever.   Respiratory: Positive for cough, chest tightness and shortness of breath.    Cardiovascular: Negative for chest pain.   Gastrointestinal: Negative for abdominal pain.   All other systems reviewed and are negative.      Physical Exam   BP: (!) 144/95  Heart Rate: 111  Temp: 98.5  F (36.9  C)  Resp: 20  Weight: 130.5 kg (287 lb 11.2 oz)  SpO2: 100 %  Physical Exam   Constitutional: She is oriented to person, place, and time. She appears  well-developed and well-nourished. No distress.   Eyes: Pupils are equal, round, and reactive to light.   Neck: JVD present.   Cardiovascular: Regular rhythm and normal heart sounds.  Tachycardia present.    Pulmonary/Chest: Effort normal. She has rales.   Abdominal: There is no tenderness.   Musculoskeletal: She exhibits edema.   Neurological: She is alert and oriented to person, place, and time.   Skin: Skin is warm. She is not diaphoretic.   Psychiatric: She has a normal mood and affect. Her behavior is normal.   Nursing note and vitals reviewed.      ED Course     ED Course     Procedures             EKG Interpretation:      Interpreted by Bruno Smith MD  Time reviewed: 12:10  Symptoms at time of EKG: Chest Pain   Rhythm: sinus tachycardia  Rate: 105  Axis: Normal  Ectopy: none  Conduction: normal  ST Segments/ T Waves: QTc prolongation to 528  Q Waves: none  Comparison to prior: Unchanged    Clinical Impression: no acute changes        Medications   bumetanide (BUMEX) injection 2 mg (2 mg Intravenous Given 9/30/17 1403)   sodium chloride (PF) 0.9% PF flush 100 mL (100 mLs Intravenous Given 9/30/17 1337)   iopamidol (ISOVUE-370) solution 77 mL (77 mLs Intravenous Given 9/30/17 1337)   potassium chloride (KAYCIEL) solution 40 mEq (40 mEq Oral Given 9/30/17 1406)       Results for orders placed or performed during the hospital encounter of 09/30/17   XR Chest 2 Views    Narrative     EXAM: XR CHEST 2 VW  9/30/2017 12:46 PM     HISTORY:  soa       COMPARISON: 9/9/2016    FINDINGS: PA and lateral views of chest. Cardiac defibrillator device  is in place. Enlarged cardiac silhouette. No pneumothorax. No pleural  effusion. Indistinct pulmonary vasculature. No focal airspace opacity.      Impression    IMPRESSION:  Cardiomegaly.  Mild pulmonary vascular congestion.     I have personally reviewed the examination and initial interpretation  and I agree with the findings.    SHAVONNE GASTELUM MD   CT Chest Pulmonary  Embolism w Contrast    Narrative    Preliminary report:  This is a preliminary resident interpretation. Full report to follow.      Impression    IMPRESSION:   1. No pulmonary embolism demonstrated.  2. Diffuse groundglass opacity and mild small septal thickening  suggestive of pulmonary edema.  3. Air trapping the superior segment of right lower lobe likely due to  small airway disease.  4. Small consolidation in the inferior posterior left lower lobe  likely round atelectasis/aspiration.          Labs Ordered and Resulted from Time of ED Arrival Up to the Time of Departure from the ED   CBC WITH PLATELETS DIFFERENTIAL - Abnormal; Notable for the following:        Result Value    Hemoglobin 10.1 (*)     Hematocrit 33.5 (*)     MCH 25.2 (*)     MCHC 30.1 (*)     RDW 16.6 (*)     All other components within normal limits   BASIC METABOLIC PANEL - Abnormal; Notable for the following:     Potassium 3.3 (*)     Glucose 107 (*)     Calcium 8.3 (*)     All other components within normal limits   NT PROBNP INPATIENT - Abnormal; Notable for the following:     N-Terminal Pro BNP Inpatient 468 (*)     All other components within normal limits   TROPONIN I   ROUTINE UA WITH MICROSCOPIC   DRUG ABUSE SCREEN 6 CHEM DEP URINE ([facility])   STRICT INTAKE AND OUTPUT            Assessments & Plan (with Medical Decision Making)   37-year-old female history of dilated cardiomyopathy here with shortness of breath, chest pain, orthopnea, dyspnea with exertion, weight gain and increased peripheral edema.  She s also been contacted by the device nurse saying that the impedance was high and she was likely fluid overloaded.  She did go to an outside hospital recently who discharged her which she felt was inappropriate.  Here she has peripheral edema, tachycardia, but not hypoxia. She does have a history of pulmonary embolism and is on Xarelto. She says she is compliant.  Her EKG shows sinus tachycardia with prolonged QT at 528.  This is unchanged  from previous.  She was given IV Bumex in the Emergency Department. CT of the chest was performed to exclude pulmonary embolism, result is pending; I suspect it will be negative.  Case is discussed with the cardiology attending who has accepted her.  Potassium was slightly low at 3.3 and was replaced orally.    4:17 PM The patient is asking to be discharged    This part of the document was transcribed by Frankie Iqbal, Medical Scribe.       I have reviewed the nursing notes.    I have reviewed the findings, diagnosis, plan and need for follow up with the patient.    New Prescriptions    No medications on file       Final diagnoses:   Dilated cardiomyopathy (H)   Dyspnea, unspecified type   Other hypervolemia   Hypokalemia   I, Frankie Iqbal, am serving as a trained medical scribe to document services personally performed by Bruno Smith MD, based on the provider's statements to me.      I, Bruno Smith MD, was physically present and have reviewed and verified the accuracy of this note documented by Frankie Iqbal.       9/30/2017   [facility], [place], EMERGENCY DEPARTMENT     Bruno Smith MD  09/30/17 2219       Bruno Smith MD  09/30/17 [id number]

## 2017-09-30 NOTE — ED NOTES
Rosalinda pacemaker/ICD device RN calling. RN reports no arrhythmias but device shows increased thoracic impedence indicating fluid overload. ER MD Smith notified.

## 2017-09-30 NOTE — PROGRESS NOTES
[employer] Carelink Express remote ICD transmission received and reviewed. Device transmission sent per MD orders from the [facility] ER. Her presenting rhythm is Regular VS @ 100 bpm. No arrhythmias have been recorded since 9/1/17. Normal device function.  <0.1%.No short V-V intervals recorded. Lead trends appear stable. OptiVol thoracic impedance continues to show increasing fluid retention. This correlates with pt symptoms noted in note on 9/27/17. Battery estimates 10 years to JOSHUA. ER RN, Ashley, notified of transmission results.  Remote ICD transmission

## 2017-09-30 NOTE — MR AVS SNAPSHOT
After Visit Summary   9/30/2017    Carolyn Aggarwal    MRN: [id number]           Patient Information     Date Of Birth          1980        Visit Information        Provider Department      9/30/2017 6:00 AM UC ICD REMOTE [facility]        Today's Diagnoses     Cardiomyopathy (H)    -  1       Follow-ups after your visit        Your next 10 appointments already scheduled     Oct 09, 2017  5:00 PM CDT   Lab with BRIAN [facility]   [facility] ([facility])    [address] [place] [address]  [address] [address] [phone]            Oct 09, 2017  5:30 PM CDT   (Arrive by 5:15 PM)   CORE RETURN with PAULA Mohr CNP   [facility] ([facility])    [address] [place] [address] [phone]              Who to contact     If you have questions or need follow up information about today's clinic visit or your schedule please contact [facility] directly at [phone].  Normal or non-critical lab and imaging results will be communicated to you by [employer], letter or phone within 4 business days after the clinic has received the results. If you do not hear from us within 7 days, please contact the clinic through [employer] or phone. If you have a critical or abnormal lab result, we will notify you by phone as soon as possible.  Submit refill requests through [employer] or call your pharmacy and they will forward the refill request to us. Please allow 3 business days for your refill to be completed.          Additional Information About Your Visit        [employer] Information     [employer] gives you secure access to your electronic health record. If you see a primary care provider, you can also send messages to your care team and make appointments. If you have questions, please call your primary care clinic.  If you do not have a primary care provider, please call [phone] and they will assist you.         Care EveryWhere ID     This is your Care EveryWhere ID. This could be used by other organizations to access your [facility] medical records  [id number]        Your Vitals Were     Last Period                   09/14/2017            Blood Pressure from Last 3 Encounters:   09/30/17 (!) 134/91   09/23/16 120/72   09/12/16 131/70    Weight from Last 3 Encounters:   09/30/17 130.5 kg (287 lb 11.2 oz)   09/23/16 126.5 kg (278 lb 14.4 oz)   09/21/16 126.9 kg (279 lb 12.8 oz)              We Performed the Following     INTERROGATION DEVICE EVAL REMOTE, PACER/ICD        Primary Care Provider Office Phone # Fax #    Hiwot Gonzalez [phone]       [facility] [address] [facility] [address]        Equal Access to Services     [facility] : Hadii aad ku hadasho Soomaali, waaxda luqadaha, qaybta kaalmada adeegyada, kanwal saezin hayaaeladio mane . So [facility] [phone].    ATENCIÓN: Si habla español, tiene a watson disposición servicios gratuitos de asistencia lingüística. Llame al [phone].    We comply with applicable federal civil rights laws and [place] laws. We do not discriminate on the basis of race, color, national origin, age, disability, sex, sexual orientation, or gender identity.            Thank you!     Thank you for choosing [facility]  for your care. Our goal is always to provide you with excellent care. Hearing back from our patients is one way we can continue to improve our services. Please take a few minutes to complete the written survey that you may receive in the mail after your visit with us. Thank you!             Your Updated Medication List - Protect others around you: Learn how to safely use, store and throw away your medicines at www.disposemymeds.org.          This list is accurate as of: 9/30/17 11:59 PM.  Always use your most recent med list.                   Brand Name Dispense Instructions for use Diagnosis    buPROPion 300 MG 24 hr tablet     WELLBUTRIN XL     Take 300 mg by mouth        escitalopram 10 MG tablet    LEXAPRO     Take 10 mg by mouth daily        * gabapentin 300 MG capsule    NEURONTIN     Take 300 mg by mouth 2 times daily        * gabapentin 300 MG capsule    NEURONTIN     Take 600 mg by mouth 2 times daily        IRON (FERROUS GLUCONATE) PO      Take 325 mg by mouth        isosorbide dinitrate 10 MG tablet    ISORDIL    90 tablet    Take 1 tablet (10 mg) by mouth 3 times daily    Chronic systolic heart failure (H)       isosorbide mononitrate 30 MG 24 hr tablet    IMDUR     Take 30 mg by mouth        lamoTRIgine 25 MG tablet    LaMICtal     Take 25 mg by mouth        * losartan 25 MG tablet    COZAAR    90 tablet    Take 1 tablet (25 mg) by mouth daily    Chronic systolic congestive heart failure (H)       * losartan 25 MG tablet    COZAAR     Take 12.5 mg by mouth        magnesium oxide 400 (240 MG) MG tablet    MAG-OX     Take 400 mg by mouth        magnesium oxide 400 MG Caps      Take 400 mg by mouth daily    Hypomagnesemia       metoprolol 100 MG 24 hr tablet    TOPROL-XL     Take 1 tablet (100 mg) by mouth daily    Chronic systolic heart failure (H), NSTEMI (non-ST elevated myocardial infarction) (H)       naltrexone 50 MG tablet    DEPADE;REVIA    30 tablet    Take 1/2 Tablet then increase to maximum of 1 Full Tablet daily as tolerated.  Time it one to two hours prior to worst cravings    Morbid obesity, unspecified obesity type (H)       omeprazole 20 MG CR capsule    priLOSEC     Take 1 capsule (20 mg) by mouth daily    Gastroesophageal reflux disease without esophagitis       pantoprazole 40 MG EC tablet    PROTONIX     Take 40 mg by mouth        Potassium Chloride 40 MEQ/15ML (20%) Soln     1350 mL    Take 15 mLs (40 mEq) by mouth 3 times daily    Hypokalemia       potassium chloride SA 20 MEQ CR tablet    K-DUR/KLOR-CON M     Take 40 mEq by mouth daily        RA CETIRIZINE 10 MG tablet   Generic drug:  cetirizine       Take 10 mg by mouth        rivaroxaban ANTICOAGULANT 20 MG Tabs tablet    XARELTO    90 tablet    Take 1 tablet (20 mg) by mouth daily (with dinner)    History of pulmonary embolism       spironolactone 25 MG tablet    ALDACTONE    180 tablet    Take 2 tablets (50 mg) by mouth daily    Gastroesophageal reflux disease without esophagitis       * topiramate 25 MG tablet    TOPAMAX    90 tablet    25 mg at bedtime for 1 week, 50 mg at bedtime for 1 week and 75 mg daily at bedtime thereafter    Morbid obesity due to excess calories (H)       * topiramate 50 MG tablet    TOPAMAX    120 tablet    Take 1 tablet (50 mg) by mouth 2 times daily    Morbid obesity, unspecified obesity type (H)       torsemide 20 MG tablet    DEMADEX    90 tablet    Take 3 tablets (60 mg) by mouth daily    Chronic systolic congestive heart failure (H)       traZODone 100 MG tablet    DESYREL     Take 100 mg by mouth At Bedtime    Depression with anxiety       * Notice:  This list has 6 medication(s) that are the same as other medications prescribed for you. Read the directions carefully, and ask your doctor or other care provider to review them with you.

## 2017-09-30 NOTE — ED NOTES
"Patient said, \"I want to be discharged. I'm already feeling relief. I can take my own diuretics at home.\" Dr. Smith notified.   "

## 2017-09-30 NOTE — ED NOTES
Carolyn presents with c/o SOB, dry cough, edema and chest pain for past week. She has a history of CAD, MI, PE and CHF with a defibrillator. Carolyn states she was evaluated at [facility] last week but discharged to home. Her device nurse advised she have an evaluation here based on an abnormal device reading.

## 2017-09-30 NOTE — ED NOTES
ED to Floor Handoff      S:  Carolyn Aggarwal is a 37 year old female who speaks English and lives with family members,  in a home  They arrived in the ED by car from home with a complaint of Shortness of Breath  Pt also complains of chest fullness and fluid overload. Pt is concerned about fluid overload due to increased shortness of breath, lower leg edema, and weight gain.    Initial vitals were:   BP: (!) 144/95  Heart Rate: 111  Temp: 98.5  F (36.9  C)  Resp: 20  Weight: 130.5 kg (287 lb 11.2 oz)  SpO2: 100 %  Allergies:   Allergies   Allergen Reactions     Tape [Adhesive Tape]      Liquid Adhesive Rash   .  The meds given in the ED and their home medications are:   No current facility-administered medications for this encounter.      Current Outpatient Prescriptions   Medication     naltrexone (DEPADE;REVIA) 50 MG tablet     topiramate (TOPAMAX) 50 MG tablet     buPROPion (WELLBUTRIN XL) 300 MG 24 hr tablet     cetirizine (RA CETIRIZINE) 10 MG tablet     escitalopram (LEXAPRO) 10 MG tablet     IRON, FERROUS GLUCONATE, PO     isosorbide mononitrate (IMDUR) 30 MG 24 hr tablet     lamoTRIgine (LAMICTAL) 25 MG tablet     pantoprazole (PROTONIX) 40 MG enteric coated tablet     potassium chloride SA (K-DUR,KLOR-CON M) 20 MEQ tablet     gabapentin (NEURONTIN) 300 MG capsule     losartan (COZAAR) 25 MG tablet     magnesium oxide (MAG-OX) 400 (240 MG) MG tablet     gabapentin (NEURONTIN) 300 MG capsule     Potassium Chloride 40 MEQ/15ML (20%) SOLN     topiramate (TOPAMAX) 25 MG tablet     torsemide (DEMADEX) 20 MG tablet     spironolactone (ALDACTONE) 25 MG tablet     isosorbide dinitrate (ISORDIL) 10 MG tablet     losartan (COZAAR) 25 MG tablet     rivaroxaban ANTICOAGULANT (XARELTO) 20 MG TABS tablet     magnesium oxide 400 MG CAPS     metoprolol (TOPROL-XL) 100 MG 24 hr tablet     omeprazole (PRILOSEC) 20 MG capsule     traZODone (DESYREL) 100 MG tablet     Social demographics are   Social History     Social History      Marital status: Single     Spouse name: N/A     Number of children: N/A     Years of education: N/A     Social History Main Topics     Smoking status: Former Smoker     Packs/day: 1.00     Years: 1.00     Start date: 5/6/1990     Quit date: 7/18/2015     Smokeless tobacco: Never Used     Alcohol use No      Comment: 1d ~ 1x/mo MAX     Drug use: No     Sexual activity: No     Other Topics Concern     Not on file     Social History Narrative       B:   The patient has been ill for 4 day(s) and during this time the symptoms have increased.  In the ED was diagnosed with   Final diagnoses:   Dilated cardiomyopathy (H)   Dyspnea, unspecified type   Other hypervolemia   Hypokalemia    Infection/sepsis suspected:No Isolation type; No active isolations   A:   In the ED these meds were given:   Medications   bumetanide (BUMEX) injection 2 mg (2 mg Intravenous Given 9/30/17 1403)   sodium chloride (PF) 0.9% PF flush 100 mL (100 mLs Intravenous Given 9/30/17 1337)   iopamidol (ISOVUE-370) solution 77 mL (77 mLs Intravenous Given 9/30/17 1337)   potassium chloride (KAYCIEL) solution 40 mEq (40 mEq Oral Given 9/30/17 1406)     Drips running?  No  Labs results   Labs Ordered and Resulted from Time of ED Arrival Up to the Time of Departure from the ED   CBC WITH PLATELETS DIFFERENTIAL - Abnormal; Notable for the following:        Result Value    Hemoglobin 10.1 (*)     Hematocrit 33.5 (*)     MCH 25.2 (*)     MCHC 30.1 (*)     RDW 16.6 (*)     All other components within normal limits   BASIC METABOLIC PANEL - Abnormal; Notable for the following:     Potassium 3.3 (*)     Glucose 107 (*)     Calcium 8.3 (*)     All other components within normal limits   NT PROBNP INPATIENT - Abnormal; Notable for the following:     N-Terminal Pro BNP Inpatient 468 (*)     All other components within normal limits   TROPONIN I   ROUTINE UA WITH MICROSCOPIC   DRUG ABUSE SCREEN 6 CHEM DEP URINE ([facility])   HEPATIC PANEL   PROCALCITONIN   STRICT INTAKE  AND OUTPUT     Imaging Studies:   Recent Results (from the past 24 hour(s))   XR Chest 2 Views    Narrative     EXAM: XR CHEST 2 VW  9/30/2017 12:46 PM     HISTORY:  soa       COMPARISON: 9/9/2016    FINDINGS: PA and lateral views of chest. Cardiac defibrillator device  is in place. Enlarged cardiac silhouette. No pneumothorax. No pleural  effusion. Indistinct pulmonary vasculature. No focal airspace opacity.      Impression    IMPRESSION:  Cardiomegaly.  Mild pulmonary vascular congestion.     I have personally reviewed the examination and initial interpretation  and I agree with the findings.    SHAVONNE GASTELUM MD   CT Chest Pulmonary Embolism w Contrast    Narrative    EXAMINATION: CT CHEST PULMONARY EMBOLISM W CONTRAST, 9/30/2017 1:48 PM      COMPARISON: Chest x-ray on the same day.    HISTORY: Dyspnea, previous PE    TECHNIQUE: Volumetric helical acquisition of CT images of the chest  from the lung apices to the kidneys were acquired in arterial phase  after the administration of IV contrast. Contrast dose: 77ml [id number]    DLP: 292 mGy*cm    FINDINGS:   Streak artifacts left chest wall pacemaker/ICD and leads slightly  limiting the diagnostic quality of this study. Cardiac device leads  are intact.    There is adequate opacification of the the main and lobar pulmonary  arteries.   No filling defect in the lobar and main segmental pulmonary arteries  to suggest pulmonary embolism. Respiratory artifact limits evaluation  of the subsegmental arteries at the lung bases which appear  heterogeneous and somewhat attenuated though no convincing filling  defect is identified. No intracardiac thrombus. Main pulmonary artery  is borderline enlarged, measuring 3.1 cm.    Reflux of contrast into the central hepatic venous system likely  secondary to power injection rather than right-sided heart strain.  No  flattening of the intraventricular septum. The thoracic aorta is  normal without evidence of dissection or aneurysm.  There is no pleural  or pericardial effusion.  There is no pneumothorax.     Diffuse groundglass opacity in the both lungs with mild smooth septal  thickening more prominent in the lower lung, suggestive of pulmonary  edema. Left chest wall ICD/pacemaker with lead in the right ventricle.   Small opacity in the posterior left lower lobe likely represents  atelectasis versus aspiration. Air trapping in the superior segment of  right lower lobe may be secondary to small airway disease.    Upper abdomen: Limited evaluation due to timing of the contrast bolus  and the coverage.     Bones:   No acute or suspicious bony abnormalities.      Impression    IMPRESSION:   1. No main, lobar or segmental pulmonary embolus. Respiratory motion  artifact limits evaluation of the segmental pulmonary arteries which  appears somewhat heterogeneous and attenuated though no convincing  filling defect is identified.  2. Diffuse groundglass opacity with areas of smooth interlobular  septal thickening suggestive of pulmonary edema. No pleural effusion.  3. Air trapping the superior segment of right lower lobe may be  secondary to small airway disease.  4. Small area of consolidation in the inferior posterior left lower  lobe has appearance of rounded atelectasis; however, no definite  overlying pleural abnormality is identified. Alternative  considerations include ring and/or consolidation.    I have personally reviewed the examination and initial interpretation  and I agree with the findings.    KUSUM NIEVES MD     Recent vital signs BP (!) 134/91  Temp 98.5  F (36.9  C) (Oral)  Resp 25  Wt 130.5 kg (287 lb 11.2 oz)  LMP 09/14/2017  SpO2 99%  BMI 52.6 kg/m2  Cardiac Rhythm: ,   Cardiac  Cardiac Rhythm: Normal sinus rhythm  Abnormal labs/tests/findings requiring intervention:---  Pain control: pt had none  Nausea control: pt had none  R:   Transfer assistance needed: Stand with Assist  Family present during ED course? No   Family  currently present? No  Pt needs tele? Yes  Code Status: Full Code  Tasks needing to be completed:  -Pt is strict I &O, low sodium diet per ER MD Ashley Espinoza  [address] [facility]

## 2017-09-30 NOTE — ED AVS SNAPSHOT
[facility], [place], Emergency Department    [address] [facility] [address]    Phone:  [phone]                                       Carolyn Aggarwal   MRN: [id number]    Department:  [facility], Emergency Department   Date of Visit:  9/30/2017           After Visit Summary Signature Page     I have received my discharge instructions, and my questions have been answered. I have discussed any challenges I see with this plan with the nurse or doctor.    ..........................................................................................................................................  Patient/Patient Representative Signature      ..........................................................................................................................................  Patient Representative Print Name and Relationship to Patient    ..................................................               ................................................  Date                                            Time    ..........................................................................................................................................  Reviewed by Signature/Title    ...................................................              ..............................................  Date                                                            Time

## 2017-09-30 NOTE — ED AVS SNAPSHOT
[facility], Emergency Department    [address] [address]    Phone:  [phone]                                       Carolyn Aggarwal   MRN: [id number]    Department:  [facility], Emergency Department   Date of Visit:  9/30/2017           Patient Information     Date Of Birth          1980        Your diagnoses for this visit were:     Dilated cardiomyopathy (H)     Dyspnea, unspecified type     Other hypervolemia     Hypokalemia        You were seen by Bruno Smith MD.        Discharge Instructions       Follow up with your Cardiologist  Low salt diet  Continue your current medications    Future Appointments        Provider Department Dept Phone Center    10/9/2017 5:00 PM Lab [facility] [phone] [facility]    10/9/2017 5:30 PM PAULA Lazo [facility] [phone] [facility]      24 Hour Appointment Hotline       To make an appointment at any [facility] clinic, call [phone] ([phone]). If you don't have a family doctor or clinic, we will help you find one. [facility] clinics are conveniently located to serve the needs of you and your family.             Review of your medicines      Our records show that you are taking the medicines listed below. If these are incorrect, please call your family doctor or clinic.        Dose / Directions Last dose taken    buPROPion 300 MG 24 hr tablet   Commonly known as:  WELLBUTRIN XL   Dose:  300 mg        Take 300 mg by mouth   Refills:  0        escitalopram 10 MG tablet   Commonly known as:  LEXAPRO   Dose:  10 mg        Take 10 mg by mouth daily   Refills:  0        * gabapentin 300 MG capsule   Commonly known as:  NEURONTIN   Dose:  300 mg        Take 300 mg by mouth 2 times daily   Refills:  0        * gabapentin 300 MG capsule   Commonly known as:  NEURONTIN   Dose:  600 mg        Take 600 mg by mouth 2 times daily   Refills:  0        IRON (FERROUS GLUCONATE) PO   Dose:  325 mg        Take 325 mg by  mouth   Refills:  0        isosorbide dinitrate 10 MG tablet   Commonly known as:  ISORDIL   Dose:  10 mg   Quantity:  90 tablet        Take 1 tablet (10 mg) by mouth 3 times daily   Refills:  3        isosorbide mononitrate 30 MG 24 hr tablet   Commonly known as:  IMDUR   Dose:  30 mg        Take 30 mg by mouth   Refills:  0        lamoTRIgine 25 MG tablet   Commonly known as:  LaMICtal   Dose:  25 mg        Take 25 mg by mouth   Refills:  0        * losartan 25 MG tablet   Commonly known as:  COZAAR   Dose:  25 mg   Quantity:  90 tablet        Take 1 tablet (25 mg) by mouth daily   Refills:  3        * losartan 25 MG tablet   Commonly known as:  COZAAR   Dose:  12.5 mg        Take 12.5 mg by mouth   Refills:  0        magnesium oxide 400 (240 MG) MG tablet   Commonly known as:  MAG-OX   Dose:  400 mg        Take 400 mg by mouth   Refills:  0        magnesium oxide 400 MG Caps   Dose:  400 mg        Take 400 mg by mouth daily   Refills:  0        metoprolol 100 MG 24 hr tablet   Commonly known as:  TOPROL-XL   Dose:  100 mg        Take 1 tablet (100 mg) by mouth daily   Refills:  0        naltrexone 50 MG tablet   Commonly known as:  DEPADE;REVIA   Quantity:  30 tablet        Take 1/2 Tablet then increase to maximum of 1 Full Tablet daily as tolerated.  Time it one to two hours prior to worst cravings   Refills:  3        omeprazole 20 MG CR capsule   Commonly known as:  priLOSEC   Dose:  20 mg        Take 1 capsule (20 mg) by mouth daily   Refills:  0        pantoprazole 40 MG EC tablet   Commonly known as:  PROTONIX   Dose:  40 mg        Take 40 mg by mouth   Refills:  0        Potassium Chloride 40 MEQ/15ML (20%) Soln   Dose:  40 mEq   Quantity:  1350 mL        Take 15 mLs (40 mEq) by mouth 3 times daily   Refills:  11        potassium chloride SA 20 MEQ CR tablet   Commonly known as:  K-DUR/KLOR-CON M   Dose:  40 mEq        Take 40 mEq by mouth daily   Refills:  0        RA CETIRIZINE 10 MG tablet   Dose:  10  mg   Generic drug:  cetirizine        Take 10 mg by mouth   Refills:  0        rivaroxaban ANTICOAGULANT 20 MG Tabs tablet   Commonly known as:  XARELTO   Dose:  20 mg   Quantity:  90 tablet        Take 1 tablet (20 mg) by mouth daily (with dinner)   Refills:  5        spironolactone 25 MG tablet   Commonly known as:  ALDACTONE   Dose:  50 mg   Quantity:  180 tablet        Take 2 tablets (50 mg) by mouth daily   Refills:  3        * topiramate 25 MG tablet   Commonly known as:  TOPAMAX   Quantity:  90 tablet        25 mg at bedtime for 1 week, 50 mg at bedtime for 1 week and 75 mg daily at bedtime thereafter   Refills:  2        * topiramate 50 MG tablet   Commonly known as:  TOPAMAX   Dose:  50 mg   Quantity:  120 tablet        Take 1 tablet (50 mg) by mouth 2 times daily   Refills:  3        torsemide 20 MG tablet   Commonly known as:  DEMADEX   Dose:  60 mg   Quantity:  90 tablet        Take 3 tablets (60 mg) by mouth daily   Refills:  3        traZODone 100 MG tablet   Commonly known as:  DESYREL   Dose:  100 mg        Take 100 mg by mouth At Bedtime   Refills:  0        * Notice:  This list has 6 medication(s) that are the same as other medications prescribed for you. Read the directions carefully, and ask your doctor or other care provider to review them with you.            Procedures and tests performed during your visit     Procedure/Test Number of Times Performed    Basic metabolic panel 1    CBC with platelets differential 1    CT Chest Pulmonary Embolism w Contrast 1    Drug abuse screen 6 urine (chem dep) 1    ED Bed Request 2    EKG 12-lead, tracing only 2    Hepatic panel 1    Nt probnp inpatient (BNP) 1    Procalcitonin 1    Strict intake and output 1    Troponin I 1    UA with Microscopic 1    XR Chest 2 Views 1      Orders Needing Specimen Collection     None      Pending Results     Date and Time Order Name Status Description    9/30/2017 1355 Drug abuse screen 6 urine (chem dep) In process      9/30/2017 1232 Procalcitonin In process     9/30/2017 1232 Hepatic panel In process     9/30/2017 1203 EKG 12-lead, tracing only Preliminary             Pending Culture Results     Date and Time Order Name Status Description    9/30/2017 1355 Drug abuse screen 6 urine (chem dep) In process             Pending Results Instructions     If you had any lab results that were not finalized at the time of your Discharge, you can call the ED Lab Result RN at [phone]. You will be contacted by this team for any positive Lab results or changes in treatment. The nurses are available 7 days a week from 10A to 6:30P.  You can leave a message 24 hours per day and they will return your call.        Thank you for choosing [facility]       Thank you for choosing [facility] for your care. Our goal is always to provide you with excellent care. Hearing back from our patients is one way we can continue to improve our services. Please take a few minutes to complete the written survey that you may receive in the mail after you visit with us. Thank you!        [employer] Information     [employer] gives you secure access to your electronic health record. If you see a primary care provider, you can also send messages to your care team and make appointments. If you have questions, please call your primary care clinic.  If you do not have a primary care provider, please call [phone] and they will assist you.        Care EveryWhere ID     This is your Care EveryWhere ID. This could be used by other organizations to access your [facility] medical records  [id number]        Equal Access to Services     NAA ROD : Hadii steven Johnson, waaxda luanneadaha, qaybta kaalmada wilderda, kanwal leos adecandelaria ramos. So [facility] [phone].    ATENCIÓN: Si habla español, tiene a watson disposición servicios gratuitos de asistencia lingüística. Llame al [phone].    We comply with applicable federal civil rights laws and [place]  laws. We do not discriminate on the basis of race, color, national origin, age, disability, sex, sexual orientation, or gender identity.            After Visit Summary       This is your record. Keep this with you and show to your community pharmacist(s) and doctor(s) at your next visit.

## 2017-10-01 LAB — INTERPRETATION ECG - MUSE: NORMAL

## 2017-10-02 NOTE — PROGRESS NOTES
Medtronic Carelink Express remote ICD transmission received and reviewed. Device transmission sent per MD orders from the [facility] ER. Her presenting rhythm is Regular VS @ 100 bpm. No arrhythmias have been recorded since 9/1/17. Normal device function.  <0.1%.No short V-V intervals recorded. Lead trends appear stable. OptiVol thoracic impedance continues to show increasing fluid retention. This correlates with pt symptoms noted in note on 9/27/17. Battery estimates 10 years to JOSHUA. ER RN, Ashley, notified of transmission results.  Rosalinda Tsang RN    Remote ICD transmission

## 2017-10-03 ENCOUNTER — CARE COORDINATION (OUTPATIENT)
Dept: CARDIOLOGY | Facility: CLINIC | Age: 37
End: 2017-10-03

## 2017-10-03 DIAGNOSIS — I50.22 CHRONIC SYSTOLIC HEART FAILURE (H): Primary | ICD-10-CM

## 2017-10-10 DIAGNOSIS — G47.33 OBSTRUCTIVE SLEEP APNEA: Primary | ICD-10-CM

## 2018-03-19 ENCOUNTER — ALLIED HEALTH/NURSE VISIT (OUTPATIENT)
Dept: CARDIOLOGY | Facility: CLINIC | Age: 38
End: 2018-03-19
Attending: INTERNAL MEDICINE
Payer: COMMERCIAL

## 2018-03-19 DIAGNOSIS — I50.22 CHRONIC SYSTOLIC HEART FAILURE (H): Primary | ICD-10-CM

## 2018-03-19 PROCEDURE — 93295 DEV INTERROG REMOTE 1/2/MLT: CPT | Performed by: INTERNAL MEDICINE

## 2018-03-19 PROCEDURE — 93296 REM INTERROG EVL PM/IDS: CPT | Mod: ZF

## 2018-03-19 NOTE — MR AVS SNAPSHOT
After Visit Summary   3/19/2018    Carolyn Aggarwal    MRN: [id number]           Patient Information     Date Of Birth          1980        Visit Information        Provider Department      3/19/2018 6:00 AM UC ICD REMOTE [facility]        Today's Diagnoses     Chronic systolic heart failure; EF 20% (12/15/15)    -  1       Follow-ups after your visit        Who to contact     If you have questions or need follow up information about today's clinic visit or your schedule please contact [facility] directly at [phone].  Normal or non-critical lab and imaging results will be communicated to you by [employer], letter or phone within 4 business days after the clinic has received the results. If you do not hear from us within 7 days, please contact the clinic through "[employer] or phone. If you have a critical or abnormal lab result, we will notify you by phone as soon as possible.  Submit refill requests through "[employer] or call your pharmacy and they will forward the refill request to us. Please allow 3 business days for your refill to be completed.          Additional Information About Your Visit        MyChart Information     "[employer] gives you secure access to your electronic health record. If you see a primary care provider, you can also send messages to your care team and make appointments. If you have questions, please call your primary care clinic.  If you do not have a primary care provider, please call [phone] and they will assist you.        Care EveryWhere ID     This is your Care EveryWhere ID. This could be used by other organizations to access your [facility] medical records  [id number]         Blood Pressure from Last 3 Encounters:   09/30/17 (!) 134/91   09/23/16 120/72   09/12/16 131/70    Weight from Last 3 Encounters:   09/30/17 130.5 kg (287 lb 11.2 oz)   09/23/16 126.5 kg (278 lb 14.4 oz)   09/21/16 126.9 kg (279 lb 12.8 oz)              We Performed the Following      INTERROGATION DEVICE EVAL REMOTE, PACER/ICD        Primary Care Provider Office Phone # Fax #    Hiwot Gonzalez [phone]       [facility] [address] [address]        Equal Access to Services     NAA ROD : Hadii aad ku hadasho Soomaali, waaxda luqadaha, qaybta kaalmada adeegyada, waxay idiin hayaan adeeg khbrigidash laelbert . So [facility] [place] [phone].    ATENCIÓN: Si habla español, tiene a watson disposición servicios gratuitos de asistencia lingüística. Llame al [phone].    We comply with applicable federal civil rights laws and [place] laws. We do not discriminate on the basis of race, color, national origin, age, disability, sex, sexual orientation, or gender identity.            Thank you!     Thank you for choosing [facility]  for your care. Our goal is always to provide you with excellent care. Hearing back from our patients is one way we can continue to improve our services. Please take a few minutes to complete the written survey that you may receive in the mail after your visit with us. Thank you!             Your Updated Medication List - Protect others around you: Learn how to safely use, store and throw away your medicines at www.disposemymeds.org.          This list is accurate as of 3/19/18 11:59 PM.  Always use your most recent med list.                   Brand Name Dispense Instructions for use Diagnosis    buPROPion 300 MG 24 hr tablet    WELLBUTRIN XL     Take 300 mg by mouth        escitalopram 10 MG tablet    LEXAPRO     Take 10 mg by mouth daily        * gabapentin 300 MG capsule    NEURONTIN     Take 300 mg by mouth 2 times daily        * gabapentin 300 MG capsule    NEURONTIN     Take 600 mg by mouth 2 times daily        IRON (FERROUS GLUCONATE) PO      Take 325 mg by mouth        isosorbide dinitrate 10 MG tablet    ISORDIL    90 tablet    Take 1 tablet (10 mg) by mouth 3 times daily    Chronic systolic heart failure (H)       isosorbide  mononitrate 30 MG 24 hr tablet    IMDUR     Take 30 mg by mouth        lamoTRIgine 25 MG tablet    LaMICtal     Take 25 mg by mouth        * losartan 25 MG tablet    COZAAR    90 tablet    Take 1 tablet (25 mg) by mouth daily    Chronic systolic congestive heart failure (H)       * losartan 25 MG tablet    COZAAR     Take 12.5 mg by mouth        magnesium oxide 400 (240 MG) MG tablet    MAG-OX     Take 400 mg by mouth        magnesium oxide 400 MG Caps      Take 400 mg by mouth daily    Hypomagnesemia       metoprolol succinate 100 MG 24 hr tablet    TOPROL-XL     Take 1 tablet (100 mg) by mouth daily    Chronic systolic heart failure (H), NSTEMI (non-ST elevated myocardial infarction) (H)       naltrexone 50 MG tablet    DEPADE;REVIA    30 tablet    Take 1/2 Tablet then increase to maximum of 1 Full Tablet daily as tolerated.  Time it one to two hours prior to worst cravings    Morbid obesity, unspecified obesity type (H)       omeprazole 20 MG CR capsule    priLOSEC     Take 1 capsule (20 mg) by mouth daily    Gastroesophageal reflux disease without esophagitis       pantoprazole 40 MG EC tablet    PROTONIX     Take 40 mg by mouth        Potassium Chloride 40 MEQ/15ML (20%) Soln     1350 mL    Take 15 mLs (40 mEq) by mouth 3 times daily    Hypokalemia       potassium chloride SA 20 MEQ CR tablet    K-DUR/KLOR-CON M     Take 40 mEq by mouth daily        RA CETIRIZINE 10 MG tablet   Generic drug:  cetirizine      Take 10 mg by mouth        rivaroxaban ANTICOAGULANT 20 MG Tabs tablet    XARELTO    90 tablet    Take 1 tablet (20 mg) by mouth daily (with dinner)    History of pulmonary embolism       spironolactone 25 MG tablet    ALDACTONE    180 tablet    Take 2 tablets (50 mg) by mouth daily    Gastroesophageal reflux disease without esophagitis       * topiramate 25 MG tablet    TOPAMAX    90 tablet    25 mg at bedtime for 1 week, 50 mg at bedtime for 1 week and 75 mg daily at bedtime thereafter    Morbid obesity  due to excess calories (H)       * topiramate 50 MG tablet    TOPAMAX    120 tablet    Take 1 tablet (50 mg) by mouth 2 times daily    Morbid obesity, unspecified obesity type (H)       torsemide 20 MG tablet    DEMADEX    90 tablet    Take 3 tablets (60 mg) by mouth daily    Chronic systolic congestive heart failure (H)       traZODone 100 MG tablet    DESYREL     Take 100 mg by mouth At Bedtime    Depression with anxiety       * Notice:  This list has 6 medication(s) that are the same as other medications prescribed for you. Read the directions carefully, and ask your doctor or other care provider to review them with you.

## 2018-03-20 NOTE — PROGRESS NOTES
Preliminary Device Interrogation Results.  Final physician signed paceart report to be scanned and attached.    Scheduled Medtronic remote ICD transmission received and reviewed. Device transmission sent per MD orders. Her presenting rhythm is SR 96 bpm. 3 NSVT episodes recorded lasting 2-9 seconds. Normal device function.  <0.1%. No short V-V intervals recorded. Lead trends appear stable. OptiVol thoracic impedance suggests increasing fluid retention. Battery estimates 9.6 years to JOSHUA. Pt notified of transmission results, via VM. She was instructed to call the scheduling line to schedule with [facility] clinic and ICD check. She has not been seen in the device clinic for almost 2 years , therefore we have scheduled another transmission in 3 months.  Remote ICD transmission

## 2019-04-19 ENCOUNTER — HEALTH MAINTENANCE LETTER (OUTPATIENT)
Age: 39
End: 2019-04-19

## 2020-03-10 ENCOUNTER — HEALTH MAINTENANCE LETTER (OUTPATIENT)
Age: 40
End: 2020-03-10

## 2020-12-27 ENCOUNTER — HEALTH MAINTENANCE LETTER (OUTPATIENT)
Age: 40
End: 2020-12-27

## 2021-04-24 ENCOUNTER — HEALTH MAINTENANCE LETTER (OUTPATIENT)
Age: 41
End: 2021-04-24

## 2021-10-03 ENCOUNTER — HEALTH MAINTENANCE LETTER (OUTPATIENT)
Age: 41
End: 2021-10-03

## 2022-05-15 ENCOUNTER — HEALTH MAINTENANCE LETTER (OUTPATIENT)
Age: 42
End: 2022-05-15

## 2022-09-11 ENCOUNTER — HEALTH MAINTENANCE LETTER (OUTPATIENT)
Age: 42
End: 2022-09-11

## 2023-06-03 ENCOUNTER — HEALTH MAINTENANCE LETTER (OUTPATIENT)
Age: 43
End: 2023-06-03

## 2023-10-07 ENCOUNTER — HEALTH MAINTENANCE LETTER (OUTPATIENT)
Age: 43
End: 2023-10-07

## 2025-02-17 NOTE — TELEPHONE ENCOUNTER
Received call from patient stating that she has been increasingly short of breath since last Friday.  She had her device interrogation yesterday and was told she should be seen in the [facility] clinic soon.  Carolyn states that she is short of breath with her daily activities, her ankles are swollen and it's hard for her to walk because of this.  She also states that her entire body aches and that she has a pain in the left side of her abdomen.  Informed Carolyn that she needs to follow up with her primary care provider regarding her abdominal pain.  Reviewed patient medications with her.  She states that she is taking the following:    Losartan 25 mg daily  Magnesium Oxide  Metoprolol  mg daily  Spirolactone 50 mg daily  Xarelto 20 mg daily  Demedex 40 mg daily  Potassium 40 meq TID.    She is not taking her Isosorbide as it gives her a headache.     Per last visit with [facility], patient should be taking Demedex 60 in the AM. Carolyn states that she also has Metolazone at home that she hasn't taken.  Instructed patient to not take Metolazone until we have some lab work completed.  Asked patient to have labs drawn this week to get a baseline BMP level and to increase her Demedex to 60 mg in the AM.  Will have patient follow up in [facility] clinic on October 9th.  Patient states that she understands information provided and will call back with any further questions or concerns.    Benitez De Leon RN  RN Care Coordinator  [facility]  [phone]         Reviewed outside labs.  Uric acid 5.5.  ALT 11, AST 14.  Please notify patient of normal labs.  Our plan remains the same.